# Patient Record
Sex: FEMALE
[De-identification: names, ages, dates, MRNs, and addresses within clinical notes are randomized per-mention and may not be internally consistent; named-entity substitution may affect disease eponyms.]

---

## 2019-05-31 PROBLEM — Z00.00 ENCOUNTER FOR PREVENTIVE HEALTH EXAMINATION: Status: ACTIVE | Noted: 2019-05-31

## 2019-06-04 ENCOUNTER — APPOINTMENT (OUTPATIENT)
Dept: ORTHOPEDIC SURGERY | Facility: CLINIC | Age: 54
End: 2019-06-04

## 2019-06-05 ENCOUNTER — APPOINTMENT (OUTPATIENT)
Dept: ORTHOPEDIC SURGERY | Facility: CLINIC | Age: 54
End: 2019-06-05
Payer: MEDICAID

## 2019-06-05 VITALS
HEART RATE: 93 BPM | DIASTOLIC BLOOD PRESSURE: 64 MMHG | SYSTOLIC BLOOD PRESSURE: 96 MMHG | BODY MASS INDEX: 34.53 KG/M2 | HEIGHT: 67 IN | WEIGHT: 220 LBS

## 2019-06-05 DIAGNOSIS — Z56.0 UNEMPLOYMENT, UNSPECIFIED: ICD-10-CM

## 2019-06-05 DIAGNOSIS — Z78.9 OTHER SPECIFIED HEALTH STATUS: ICD-10-CM

## 2019-06-05 DIAGNOSIS — M54.12 RADICULOPATHY, CERVICAL REGION: ICD-10-CM

## 2019-06-05 PROCEDURE — 99204 OFFICE O/P NEW MOD 45 MIN: CPT

## 2019-06-05 SDOH — ECONOMIC STABILITY - INCOME SECURITY: UNEMPLOYMENT, UNSPECIFIED: Z56.0

## 2019-06-05 NOTE — PHYSICAL EXAM
[de-identified] : Positive Dawn on the left\par Neurologically intact distally and symmetrical\par otherwise, 5 out of 5 motor strength, sensation is intact and symmetrical full range of motion flexion extension and rotation, no palpatory tenderness full range of motion of hips knees shoulders and elbows (all four extremities), no atrophy, negative straight leg raise, no pathological reflexes, no swelling, normal ambulation, no apparent distress skin is intact, no palpable lymph nodes, no upper or lower extremity instability, alert and oriented x3 and normal mood. Normal finger-to nose test.

## 2019-06-05 NOTE — HISTORY OF PRESENT ILLNESS
[de-identified] : Patient is a pleasant, 53-year-old female comes in today with her daughter translating in Thai complaining of left radiating arm pain into the hand for one month. Patient is right hand dominant.\par She has never had this before.\par Reports going to an urgent care facility and was written out for Flexeril, which does not help.\par Two days later she went to [facility] emergency department for pain control and was put on ibuprofen, which helps little.\par Patient denies being on any blood thinners and is a nondiabetic.\par Denies any specific injury or paresthesias.\par No fever chills sweats nausea vomiting no bowel or bladder dysfunction\par no recent weight loss or gain no night pain. \par This history is in addition to the intake form that I personally reviewed.  [Stable] : stable [6] : a current pain level of 6/10

## 2019-06-05 NOTE — DISCUSSION/SUMMARY
[de-identified] : Cervical radiculopathy on the left\par Medical dose pack x 2 was prescribed\par She will followup with Dr. Sharma, or another spine specialist in 2 weeks if no better.\par Options discussed.\par All questions answered.\par The patient and daughter are in full agreement with the plan, \par and happy with the visit.\par

## 2019-06-18 ENCOUNTER — RX RENEWAL (OUTPATIENT)
Age: 54
End: 2019-06-18

## 2019-06-18 RX ORDER — METHYLPREDNISOLONE 4 MG/1
4 TABLET ORAL
Qty: 1 | Refills: 0 | Status: ACTIVE | COMMUNITY
Start: 2019-06-05 | End: 1900-01-01